# Patient Record
Sex: MALE | Race: WHITE | URBAN - METROPOLITAN AREA
[De-identification: names, ages, dates, MRNs, and addresses within clinical notes are randomized per-mention and may not be internally consistent; named-entity substitution may affect disease eponyms.]

---

## 2017-02-01 ENCOUNTER — APPOINTMENT (RX ONLY)
Dept: URBAN - METROPOLITAN AREA CLINIC 142 | Facility: CLINIC | Age: 52
Setting detail: DERMATOLOGY
End: 2017-02-01

## 2017-02-01 DIAGNOSIS — L29.89 OTHER PRURITUS: ICD-10-CM

## 2017-02-01 DIAGNOSIS — L40.0 PSORIASIS VULGARIS: ICD-10-CM

## 2017-02-01 DIAGNOSIS — L29.8 OTHER PRURITUS: ICD-10-CM

## 2017-02-01 PROCEDURE — ? OTHER

## 2017-02-01 PROCEDURE — 99203 OFFICE O/P NEW LOW 30 MIN: CPT

## 2017-02-01 PROCEDURE — ? PRESCRIPTION

## 2017-02-01 PROCEDURE — ? COUNSELING

## 2017-02-01 RX ORDER — CLOBETASOL PROPIONATE 0.46 MG/ML
ML SOLUTION TOPICAL
Qty: 50 | Refills: 6 | Status: ERX | COMMUNITY
Start: 2017-02-01

## 2017-02-01 RX ORDER — KETOCONAZOLE 20.5 MG/ML
ML SHAMPOO, SUSPENSION TOPICAL
Qty: 120 | Refills: 11 | Status: ERX | COMMUNITY
Start: 2017-02-01

## 2017-02-01 RX ORDER — TRIAMCINOLONE ACETONIDE 1 MG/G
GRAM CREAM TOPICAL BID
Qty: 454 | Refills: 11 | Status: ERX | COMMUNITY
Start: 2017-02-01

## 2017-02-01 RX ADMIN — CLOBETASOL PROPIONATE ML: 0.46 SOLUTION TOPICAL at 14:47

## 2017-02-01 RX ADMIN — KETOCONAZOLE ML: 20.5 SHAMPOO, SUSPENSION TOPICAL at 14:47

## 2017-02-01 RX ADMIN — TRIAMCINOLONE ACETONIDE GRAM: 1 CREAM TOPICAL at 14:47

## 2017-02-01 ASSESSMENT — LOCATION ZONE DERM
LOCATION ZONE: SCALP
LOCATION ZONE: TRUNK
LOCATION ZONE: ARM
LOCATION ZONE: FACE

## 2017-02-01 ASSESSMENT — LOCATION DETAILED DESCRIPTION DERM
LOCATION DETAILED: RIGHT CENTRAL FRONTAL SCALP
LOCATION DETAILED: RIGHT DISTAL DORSAL FOREARM
LOCATION DETAILED: PERIUMBILICAL SKIN
LOCATION DETAILED: RIGHT CENTRAL MALAR CHEEK

## 2017-02-01 ASSESSMENT — LOCATION SIMPLE DESCRIPTION DERM
LOCATION SIMPLE: RIGHT SCALP
LOCATION SIMPLE: RIGHT CHEEK
LOCATION SIMPLE: ABDOMEN
LOCATION SIMPLE: RIGHT FOREARM

## 2017-02-01 NOTE — PROCEDURE: OTHER
Detail Level: Zone
Note Text (......Xxx Chief Complaint.): This diagnosis correlates with the
Other (Free Text): start ketoconazole, clobetasol solution, and TAC cream. pt to f/u if symptoms do not improve over the next couple months